# Patient Record
Sex: FEMALE | Race: WHITE | NOT HISPANIC OR LATINO | URBAN - METROPOLITAN AREA
[De-identification: names, ages, dates, MRNs, and addresses within clinical notes are randomized per-mention and may not be internally consistent; named-entity substitution may affect disease eponyms.]

---

## 2020-07-25 ENCOUNTER — TELEPHONE ENCOUNTER (OUTPATIENT)
Dept: URBAN - METROPOLITAN AREA CLINIC 13 | Facility: CLINIC | Age: 60
End: 2020-07-25

## 2020-07-25 RX ORDER — POLYETHYLENE GLYCOL 3350, SODIUM CHLORIDE, SODIUM BICARBONATE AND POTASSIUM CHLORIDE WITH LEMON FLAVOR 420; 11.2; 5.72; 1.48 G/4L; G/4L; G/4L; G/4L
TAKE  ML AS DIRECTED MIX CONTENTS PER DIRECTIONS, BEGIN DRINKING 1/2 SOLUTION @ 5P, COMPLETE REMAINDER OF SOLUTION 6HR PRIOR TO PROCEDURE POWDER, FOR SOLUTION ORAL
Qty: 1 | Refills: 0 | OUTPATIENT
Start: 2014-10-10 | End: 2014-10-28

## 2020-07-26 ENCOUNTER — TELEPHONE ENCOUNTER (OUTPATIENT)
Dept: URBAN - METROPOLITAN AREA CLINIC 13 | Facility: CLINIC | Age: 60
End: 2020-07-26

## 2020-07-26 RX ORDER — DEXTROAMPHETAMINE SACCHARATE, AMPHETAMINE ASPARTATE MONOHYDRATE, DEXTROAMPHETAMINE SULFATE, AND AMPHETAMINE SULFATE 5; 5; 5; 5 MG/1; MG/1; MG/1; MG/1
CAPSULE, EXTENDED RELEASE ORAL
Qty: 30 | Refills: 0 | Status: ACTIVE | COMMUNITY
Start: 2013-10-28

## 2020-07-26 RX ORDER — DEXTROAMPHETAMINE SACCHARATE, AMPHETAMINE ASPARTATE MONOHYDRATE, DEXTROAMPHETAMINE SULFATE, AND AMPHETAMINE SULFATE 5; 5; 5; 5 MG/1; MG/1; MG/1; MG/1
CAPSULE, EXTENDED RELEASE ORAL
Qty: 30 | Refills: 0 | Status: ACTIVE | COMMUNITY
Start: 2014-01-12

## 2020-07-26 RX ORDER — ESTRADIOL 0.05 MG/D
FILM, EXTENDED RELEASE TRANSDERMAL
Qty: 8 | Refills: 0 | Status: ACTIVE | COMMUNITY
Start: 2013-12-12

## 2020-07-26 RX ORDER — DEXTROAMPHETAMINE SACCHARATE, AMPHETAMINE ASPARTATE, DEXTROAMPHETAMINE SULFATE, AND AMPHETAMINE SULFATE 5; 5; 5; 5 MG/1; MG/1; MG/1; MG/1
TAKE 1 TABLET DAILY TABLET ORAL
Refills: 0 | Status: ACTIVE | COMMUNITY
Start: 2014-10-10

## 2020-07-26 RX ORDER — LAMOTRIGINE 100 MG/1
TAKE 1 TABLET TWICE DAILY TABLET ORAL
Refills: 0 | Status: ACTIVE | COMMUNITY
Start: 2014-10-10

## 2020-07-26 RX ORDER — ESTRADIOL 0.05 MG/D
APPLY 1 PATCH TWICE WEEKLY AS DIRECTED PATCH, EXTENDED RELEASE TRANSDERMAL
Refills: 0 | Status: ACTIVE | COMMUNITY

## 2020-07-26 RX ORDER — ESTRADIOL 0.04 MG/D
FILM, EXTENDED RELEASE TRANSDERMAL
Qty: 8 | Refills: 0 | Status: ACTIVE | COMMUNITY
Start: 2014-02-04

## 2025-03-07 ENCOUNTER — DASHBOARD ENCOUNTERS (OUTPATIENT)
Age: 65
End: 2025-03-07

## 2025-03-07 ENCOUNTER — LAB OUTSIDE AN ENCOUNTER (OUTPATIENT)
Dept: URBAN - METROPOLITAN AREA CLINIC 113 | Facility: CLINIC | Age: 65
End: 2025-03-07

## 2025-03-07 ENCOUNTER — OFFICE VISIT (OUTPATIENT)
Dept: URBAN - METROPOLITAN AREA CLINIC 113 | Facility: CLINIC | Age: 65
End: 2025-03-07
Payer: MEDICARE

## 2025-03-07 VITALS
TEMPERATURE: 97.3 F | BODY MASS INDEX: 23.28 KG/M2 | SYSTOLIC BLOOD PRESSURE: 135 MMHG | WEIGHT: 157.2 LBS | HEIGHT: 69 IN | HEART RATE: 56 BPM | DIASTOLIC BLOOD PRESSURE: 92 MMHG | RESPIRATION RATE: 18 BRPM

## 2025-03-07 DIAGNOSIS — R12 HEARTBURN: ICD-10-CM

## 2025-03-07 DIAGNOSIS — R19.4 CHANGE IN BOWEL HABITS: ICD-10-CM

## 2025-03-07 DIAGNOSIS — K64.8 INTERNAL HEMORRHOIDS: ICD-10-CM

## 2025-03-07 DIAGNOSIS — Z12.11 COLON CANCER SCREENING: ICD-10-CM

## 2025-03-07 DIAGNOSIS — K62.5 BRIGHT RED BLOOD PER RECTUM: ICD-10-CM

## 2025-03-07 PROBLEM — 74474003: Status: ACTIVE | Noted: 2025-03-07

## 2025-03-07 PROBLEM — 88111009: Status: ACTIVE | Noted: 2025-03-07

## 2025-03-07 PROBLEM — 305058001: Status: ACTIVE | Noted: 2025-03-07

## 2025-03-07 PROBLEM — 16331000: Status: ACTIVE | Noted: 2025-03-07

## 2025-03-07 PROCEDURE — 99203 OFFICE O/P NEW LOW 30 MIN: CPT | Performed by: NURSE PRACTITIONER

## 2025-03-07 RX ORDER — ESTRADIOL 0.05 MG/D
FILM, EXTENDED RELEASE TRANSDERMAL
Qty: 8 | Refills: 0 | Status: ON HOLD | COMMUNITY
Start: 2013-12-12

## 2025-03-07 RX ORDER — METOPROLOL TARTRATE 25 MG/1
1 TABLET WITH FOOD TABLET, FILM COATED ORAL TWICE A DAY
Status: ACTIVE | COMMUNITY

## 2025-03-07 RX ORDER — ESTRADIOL 0.04 MG/D
FILM, EXTENDED RELEASE TRANSDERMAL
Qty: 8 | Refills: 0 | Status: ON HOLD | COMMUNITY
Start: 2014-02-04

## 2025-03-07 RX ORDER — ESTRADIOL 0.05 MG/D
APPLY 1 PATCH TWICE WEEKLY AS DIRECTED PATCH, EXTENDED RELEASE TRANSDERMAL
Refills: 0 | Status: ON HOLD | COMMUNITY

## 2025-03-07 RX ORDER — DEXTROAMPHETAMINE SACCHARATE, AMPHETAMINE ASPARTATE MONOHYDRATE, DEXTROAMPHETAMINE SULFATE, AND AMPHETAMINE SULFATE 5; 5; 5; 5 MG/1; MG/1; MG/1; MG/1
CAPSULE, EXTENDED RELEASE ORAL
Qty: 30 | Refills: 0 | Status: ACTIVE | COMMUNITY
Start: 2013-10-28

## 2025-03-07 RX ORDER — LISINOPRIL 5 MG
1 TABLET TABLET ORAL TWICE A DAY
Status: ACTIVE | COMMUNITY

## 2025-03-07 RX ORDER — DEXTROAMPHETAMINE SACCHARATE, AMPHETAMINE ASPARTATE, DEXTROAMPHETAMINE SULFATE, AND AMPHETAMINE SULFATE 5; 5; 5; 5 MG/1; MG/1; MG/1; MG/1
TAKE 1 TABLET DAILY TABLET ORAL
Refills: 0 | Status: ON HOLD | COMMUNITY
Start: 2014-10-10

## 2025-03-07 RX ORDER — LAMOTRIGINE 100 MG/1
TAKE 1 TABLET TWICE DAILY TABLET ORAL
Refills: 0 | Status: ACTIVE | COMMUNITY
Start: 2014-10-10

## 2025-03-07 NOTE — HPI-TODAY'S VISIT:
This is a 64-year-old female with a history of lung cancer, hypertension, seizure disorder, hyperlipidemia referred from Dr. Pastor for colon cancer screening. She was last seen in October 2014 for a screening colonoscopy notable for an excellent prep and internal hemorrhoids.  Screening recommended in 2024. She reports a change in bowel habits in late 2024.  She reports larger caliber stools that required straining.  She had mild proctalgia.  On 2 occasions within the last 3 weeks, she noticed a small volume of blood on the tissue.  She has identified that she was drinking less water.  Her bowel habits have improved over the last 2 or 3 days.  She denies abdominal pain.  She has nausea associated with a cough.  This has been occurring for the last week.  For the last 2 weeks, she has experienced mild heartburn 3 times a week after meals.  This is not significant enough to warrant medication.  She denies NSAIDs.  She denies other abdominal symptoms.

## 2025-03-19 ENCOUNTER — WEB ENCOUNTER (OUTPATIENT)
Dept: URBAN - METROPOLITAN AREA CLINIC 113 | Facility: CLINIC | Age: 65
End: 2025-03-19

## 2025-03-25 ENCOUNTER — OFFICE VISIT (OUTPATIENT)
Dept: URBAN - METROPOLITAN AREA SURGERY CENTER 25 | Facility: SURGERY CENTER | Age: 65
End: 2025-03-25

## 2025-04-02 ENCOUNTER — CLAIMS CREATED FROM THE CLAIM WINDOW (OUTPATIENT)
Dept: URBAN - METROPOLITAN AREA CLINIC 4 | Facility: CLINIC | Age: 65
End: 2025-04-02
Payer: MEDICARE

## 2025-04-02 ENCOUNTER — CLAIMS CREATED FROM THE CLAIM WINDOW (OUTPATIENT)
Dept: URBAN - METROPOLITAN AREA SURGERY CENTER 25 | Facility: SURGERY CENTER | Age: 65
End: 2025-04-02
Payer: MEDICARE

## 2025-04-02 DIAGNOSIS — K63.89 OTHER SPECIFIED DISEASES OF INTESTINE: ICD-10-CM

## 2025-04-02 DIAGNOSIS — Z12.11 COLON CANCER SCREENING: ICD-10-CM

## 2025-04-02 DIAGNOSIS — Z12.11 ENCOUNTER FOR SCREENING FOR MALIGNANT NEOPLASM OF COLON: ICD-10-CM

## 2025-04-02 PROCEDURE — G0500 MOD SEDAT ENDO SERVICE >5YRS: HCPCS | Performed by: CLINIC/CENTER

## 2025-04-02 PROCEDURE — 88305 TISSUE EXAM BY PATHOLOGIST: CPT | Performed by: PATHOLOGY

## 2025-04-02 PROCEDURE — 45385 COLONOSCOPY W/LESION REMOVAL: CPT | Performed by: CLINIC/CENTER

## 2025-04-02 PROCEDURE — G0500 MOD SEDAT ENDO SERVICE >5YRS: HCPCS | Performed by: STUDENT IN AN ORGANIZED HEALTH CARE EDUCATION/TRAINING PROGRAM

## 2025-04-02 PROCEDURE — 45385 COLONOSCOPY W/LESION REMOVAL: CPT | Performed by: STUDENT IN AN ORGANIZED HEALTH CARE EDUCATION/TRAINING PROGRAM

## 2025-05-07 ENCOUNTER — OFFICE VISIT (OUTPATIENT)
Dept: URBAN - METROPOLITAN AREA CLINIC 113 | Facility: CLINIC | Age: 65
End: 2025-05-07
Payer: MEDICARE

## 2025-05-07 DIAGNOSIS — Z12.11 COLON CANCER SCREENING: ICD-10-CM

## 2025-05-07 DIAGNOSIS — K64.8 INTERNAL HEMORRHOIDS: ICD-10-CM

## 2025-05-07 PROCEDURE — 99213 OFFICE O/P EST LOW 20 MIN: CPT | Performed by: NURSE PRACTITIONER

## 2025-05-07 RX ORDER — DEXTROAMPHETAMINE SACCHARATE, AMPHETAMINE ASPARTATE, DEXTROAMPHETAMINE SULFATE, AND AMPHETAMINE SULFATE 5; 5; 5; 5 MG/1; MG/1; MG/1; MG/1
TAKE 1 TABLET DAILY TABLET ORAL
Refills: 0 | Status: ON HOLD | COMMUNITY
Start: 2014-10-10

## 2025-05-07 RX ORDER — ESTRADIOL 0.04 MG/D
FILM, EXTENDED RELEASE TRANSDERMAL
Qty: 8 | Refills: 0 | Status: ON HOLD | COMMUNITY
Start: 2014-02-04

## 2025-05-07 RX ORDER — METOPROLOL TARTRATE 25 MG/1
1 TABLET WITH FOOD TABLET, FILM COATED ORAL TWICE A DAY
Status: ACTIVE | COMMUNITY

## 2025-05-07 RX ORDER — ESTRADIOL 0.05 MG/D
APPLY 1 PATCH TWICE WEEKLY AS DIRECTED PATCH, EXTENDED RELEASE TRANSDERMAL
Refills: 0 | Status: ON HOLD | COMMUNITY

## 2025-05-07 RX ORDER — LISINOPRIL 5 MG
1 TABLET TABLET ORAL TWICE A DAY
Status: ACTIVE | COMMUNITY

## 2025-05-07 RX ORDER — DEXTROAMPHETAMINE SACCHARATE, AMPHETAMINE ASPARTATE MONOHYDRATE, DEXTROAMPHETAMINE SULFATE, AND AMPHETAMINE SULFATE 5; 5; 5; 5 MG/1; MG/1; MG/1; MG/1
CAPSULE, EXTENDED RELEASE ORAL
Qty: 30 | Refills: 0 | Status: ACTIVE | COMMUNITY
Start: 2013-10-28

## 2025-05-07 RX ORDER — LAMOTRIGINE 100 MG/1
TAKE 1 TABLET TWICE DAILY TABLET ORAL
Refills: 0 | Status: ACTIVE | COMMUNITY
Start: 2014-10-10

## 2025-05-07 RX ORDER — ESTRADIOL 0.05 MG/D
FILM, EXTENDED RELEASE TRANSDERMAL
Qty: 8 | Refills: 0 | Status: ON HOLD | COMMUNITY
Start: 2013-12-12

## 2025-05-07 NOTE — HPI-TODAY'S VISIT:
This is a 64-year-old female with a history of lung cancer, hypertension, seizure disorder, hyperlipidemia presenting for follow-up after colonoscopy. She was last seen 3/7/2025 referred for colon cancer screening.  She reported 2 episodes of scant red blood per rectum associated with straining.  She had increased water and symptoms resolved.  She reported recent onset of heartburn.  Lifestyle modification and Pepcid Complete as needed recommended.  She was scheduled for colonoscopy. Colonoscopy 4/2/2025:BBPS 6, removal of 2 sessile and pedunculated transverse 1 to 3 mm polyps, grade 1 nonbleeding internal hemorrhoids.  Pathology: Polyps were benign mucosal polyps.  Colonoscopy for screening recommended in 2035. She is doing well.  She denies abdominal symptoms.

## 2025-05-07 NOTE — HPI-OTHER HISTORIES
Colonoscopy 4/2/2025:BBPS 6, removal of 2 sessile and pedunculated transverse 1 to 3 mm polyps, grade 1 nonbleeding internal hemorrhoids. Pathology: Polyps were benign mucosal polyps. Colonoscopy for screening recommended in 2035.
    H/O ovarian cystectomy    H/O: hysterectomy  LOUISE, BSO    History of total left knee replacement    Obesity    S/P appendectomy    S/P cholecystectomy    S/P colon resection  15  S/P D&C    S/P lumpectomy of breast  right breast -benign  15  Trauma  s/p multiple repair of uterine trauma secondary to an accident as a child with a broom